# Patient Record
Sex: FEMALE | Race: OTHER | HISPANIC OR LATINO | URBAN - METROPOLITAN AREA
[De-identification: names, ages, dates, MRNs, and addresses within clinical notes are randomized per-mention and may not be internally consistent; named-entity substitution may affect disease eponyms.]

---

## 2023-07-19 ENCOUNTER — EMERGENCY (EMERGENCY)
Facility: HOSPITAL | Age: 38
LOS: 1 days | Discharge: HOME | End: 2023-07-19
Attending: EMERGENCY MEDICINE
Payer: MEDICAID

## 2023-07-19 VITALS
HEIGHT: 65 IN | OXYGEN SATURATION: 97 % | HEART RATE: 67 BPM | SYSTOLIC BLOOD PRESSURE: 137 MMHG | WEIGHT: 179.9 LBS | DIASTOLIC BLOOD PRESSURE: 90 MMHG | TEMPERATURE: 99 F | RESPIRATION RATE: 20 BRPM

## 2023-07-19 PROCEDURE — 99284 EMERGENCY DEPT VISIT MOD MDM: CPT

## 2023-07-19 RX ORDER — IBUPROFEN 200 MG
600 TABLET ORAL ONCE
Refills: 0 | Status: COMPLETED | OUTPATIENT
Start: 2023-07-19 | End: 2023-07-19

## 2023-07-19 RX ORDER — ACETAMINOPHEN 500 MG
975 TABLET ORAL ONCE
Refills: 0 | Status: COMPLETED | OUTPATIENT
Start: 2023-07-19 | End: 2023-07-19

## 2023-07-19 RX ORDER — DIAZEPAM 5 MG
5 TABLET ORAL ONCE
Refills: 0 | Status: DISCONTINUED | OUTPATIENT
Start: 2023-07-19 | End: 2023-07-19

## 2023-07-19 RX ADMIN — Medication 5 MILLIGRAM(S): at 23:18

## 2023-07-19 RX ADMIN — Medication 600 MILLIGRAM(S): at 23:18

## 2023-07-19 RX ADMIN — Medication 975 MILLIGRAM(S): at 23:18

## 2023-07-19 NOTE — ED PROVIDER NOTE - PATIENT PORTAL LINK FT
You can access the FollowMyHealth Patient Portal offered by [facility] by registering at the following website: [email or web address]. By joining [facility]’s FollowMyHealth portal, you will also be able to view your health information using other applications (apps) compatible with our system.

## 2023-07-19 NOTE — ED PROVIDER NOTE - OBJECTIVE STATEMENT
37-year-old female with history of arthritis previously on methotrexate and prednisone no longer taking either medication presents to the emergency department with complaint of sided neck pain for 3 days.  Patient reports that she has pain in the left side of her neck that radiates to her head and shoulder.  She reports that she has had similar pain in the past but this feels more intense.  She has been taking Tylenol with minimal relief last dose 11 AM.  She denies falls, trauma, numbness, tingling, vision changes, chest pain, shortness of breath, abdominal pain nausea, vomiting, fevers or chills.

## 2023-07-19 NOTE — ED PROVIDER NOTE - PROGRESS NOTE DETAILS
Attending Masom:  pt feeling better, informed of ct results, pt has pcp will fu outpt, pt comfortable with this plan, had no additional questions

## 2023-07-19 NOTE — ED PROVIDER NOTE - PHYSICAL EXAMINATION
CONSTITUTIONAL: Patient is awake, alert and oriented x 3. Patient is uncomfortable appearing and in no acute distress  HEAD: NCAT  NECK: supple, no midline cervical ttp, + L paraspinal cervical ttp and L trapezius ttp   LUNGS: CTA b/l, no wheezing or rales   HEART: RRR.+S1S2 no murmurs  ABDOMEN: Soft, non-distended, nttp,  no rebound or guarding  EXTREMITY: no edema or calf tenderness b/l, FROM upper and lower ext b/l  SKIN: with no rash or lesions  NEURO: No focal deficits

## 2023-07-19 NOTE — ED PROVIDER NOTE - ATTENDING APP SHARED VISIT CONTRIBUTION OF CARE
Attending MD Lemus:   I personally have seen and examined this patient.  Physician assistant note reviewed and agree on plan of care and except where noted.  See below for details.     seen in [place] [facility], interviewed in Thai    37F with PMH/PSH including arthritis (?rheumatoid) previously on Methotrexate and Prednisone (reports has not taken in two years) presents to the ED with neck pain.  Reports she has been having L sided neck pain for three days.  Reports pain is in L side of her neck and extends to shoulder and trap area.  Denies trauma, inciting event, MVC, fall.  Reports has had pain in the past but not this intensity.  Reports took Tylenol at 6am and then again at 11am, denies relief.  Denies numbness, weakness or tingling in extremities. Denies loss of urinary or bowel continence. Denies chest pain, shortness of breath, abdominal pain, nausea, vomiting, diarrhea, urinary complaints, fevers. Denies history of malignancy, epidural injections, AC use, prior spinal surgery, AAA.     Exam:   General: NAD  HENT: head NCAT, airway patent  Eyes: anicteric, no conjunctival injection   Lungs: lungs CTAB with good inspiratory effort, no wheezing, no rhonchi, no rales  Cardiac: +S1S2, no obvious m/r/g  GI: abdomen soft with +BS, NT, ND  : no CVAT  MSK: ranging neck and extremities freely, +L paracervical and midline tenderness, +L trap tenderness, no overlying skin changes  Neuro: moving all extremities spontaneously with 5/5 strength, nonfocal, no saddle anesthesia  Psych: normal mood and affect     A/P: 37F with L sided neck pain, given reported previous chronic steroid use, will eval with CT C Spine to eval for bony injury, possible worsening of known pathology (arthritis), MSK, will give muscle relaxant, analgesic, reassess

## 2023-07-19 NOTE — ED PROVIDER NOTE - NSFOLLOWUPINSTRUCTIONS_ED_ALL_ED_FT
Ha distendido los músculos y ligamentos de hawkins nimesh. Un movimiento repentino e incómodo puede tensar el nimesh. [place] ocurre a menudo con caídas o accidentes automovilísticos o lelo ciertos deportes. Las actividades cotidianas shani trabajar en urvashi computadora o dormir también pueden causar tensión en el nimesh si lo obligan a mantener el nimesh en urvashi posición incómoda lelo mucho tiempo.  Es común que el dolor de nimesh empeore lelo nick o dos días después de urvashi lesión, slava debería comenzar a sentirse mejor después de eso. Es posible que tenga más dolor y rigidez lelo varios días antes de que mejore. [place] se espera. Puede jefferson algunas semanas o más para que sane por completo. Un buen tratamiento en el hogar puede ayudarlo a mejorar más rápido y evitar futuros problemas en el nimesh.    La atención de seguimiento es urvashi parte clave de hawkins tratamiento y seguridad. Asegúrese de hacer y asistir a todas las citas y llame a hawkins médico si tiene problemas. También es urvashi buena idea conocer los resultados de feliberto pruebas y mantener urvashi lista de los medicamentos que deysi.    ¿Cómo puedes cuidarte en casa?  Pruebe el calor o el hielo, lo que se sienta mejor. Aplíquelo lelo 10 a 20 minutos a la vez, varias veces al día. Coloque un paño marcus entre el calor o el hielo y hawkins piel. También puede intentar cambiar entre calor y hielo.  Tenga cuidado con los medicamentos. Marlyn y siga todas las instrucciones en la etiqueta.  Si no está tomando un analgésico recetado, pregúntele a hawkins médico si puede jefferson un medicamento de venta bruno.  Si el médico le recetó un medicamento para el dolor, tómelo según lo prescrito.  Guarde feliberto analgésicos recetados donde nadie más pueda acceder a ellos. Cuando haya terminado de usarlos, deséchelos de forma rápida y camacho. [place] [facility] puede tener un sitio de entrega.  Frote suavemente el área para aliviar el dolor y ayudar con el flujo sanguíneo. Tómatelo con calma lelo un par de días. Puede realizar feliberto actividades habituales si no le duelen el nimesh ni lo ponen en riesgo de sufrir más estrés o lesiones.  Trate de dormir en urvashi almohada especial para el nimesh. Colóquelo debajo de hawkins nimesh, no debajo de hawkins beau. Colocar urvashi toalla alka enrollada debajo de tu nimesh mientras duermes también funcionará. Si usa urvashi almohada para el nimesh o urvashi toalla enrollada, no use hawkins almohada normal al mismo tiempo.  Para prevenir futuros michelle de nimesh, cornelius ejercicios para estirar y fortalecer el nimesh y la espalda.    Llame al 911 en cualquier momento que crea que puede necesitar atención de emergencia. Por ejemplo, llame si:    No puede  un brazo o urvashi pierna en absoluto.  Llame a hawkins médico ahora o busque atención médica inmediata si:    Tiene síntomas nuevos o peores en feliberto brazos, piernas, pecho, abdomen o glúteos. Los síntomas pueden incluir:  Entumecimiento u hormigueo.  Debilidad.  Dolor.  Pierde el control de la vejiga o los intestinos.  Esté atento a los cambios en hawkins janis y asegúrese de comunicarse con hawkins médico si:    No estás mejorando shani esperabas.

## 2023-07-19 NOTE — ED ADULT TRIAGE NOTE - MODE OF ARRIVAL
VISIT NOTE  Patient ID: Olamide is a 28 year old female.    Chief Complaint   Patient presents with   • Conjunctivitis     HPI:Pt present with complaint eye discharge 2 days.  Pt attempted treatment with washing out without lasting improvement.  Pt denies vision changes.  Pt reports wears contacts. Pt dianosed with strep and antibiotics ordered via telephone visit      Review of Systems   Constitutional: Negative for fever.   HENT: Positive for sore throat. Negative for congestion, dental problem, drooling and ear discharge.    Eyes: Positive for discharge and redness. Negative for photophobia, pain, itching and visual disturbance.         Physical Exam  HENT:      Right Ear: A middle ear effusion is present. Tympanic membrane is not erythematous.      Left Ear: Tympanic membrane is not erythematous.      Mouth/Throat:      Lips: Pink.      Pharynx: Pharyngeal swelling and posterior oropharyngeal erythema present.      Tonsils: Tonsillar exudate present.   Eyes:      General: Lids are normal.      Extraocular Movements: Extraocular movements intact.      Conjunctiva/sclera:      Right eye: Right conjunctiva is not injected. No exudate.     Left eye: Left conjunctiva is not injected. No exudate.  Pulmonary:      Effort: Pulmonary effort is normal.   Lymphadenopathy:      Cervical: No cervical adenopathy.   Neurological:      Mental Status: She is alert.           ASSESSMENT/PLAN  ED Diagnosis   1. Bacterial conjunctivitis        2. Elevated blood-pressure reading without diagnosis of hypertension             [employer] in

## 2023-07-20 VITALS
SYSTOLIC BLOOD PRESSURE: 118 MMHG | HEART RATE: 75 BPM | OXYGEN SATURATION: 97 % | TEMPERATURE: 98 F | RESPIRATION RATE: 17 BRPM | DIASTOLIC BLOOD PRESSURE: 88 MMHG

## 2023-07-20 PROCEDURE — 72125 CT NECK SPINE W/O DYE: CPT | Mod: 26,MG

## 2023-07-20 PROCEDURE — G1004: CPT

## 2023-07-20 PROCEDURE — 99284 EMERGENCY DEPT VISIT MOD MDM: CPT | Mod: 25

## 2023-07-20 PROCEDURE — 72125 CT NECK SPINE W/O DYE: CPT | Mod: MG

## 2023-07-20 RX ORDER — LIDOCAINE 4 G/100G
1 CREAM TOPICAL ONCE
Refills: 0 | Status: COMPLETED | OUTPATIENT
Start: 2023-07-20 | End: 2023-07-20

## 2023-07-20 RX ADMIN — LIDOCAINE 1 PATCH: 4 CREAM TOPICAL at 02:38

## 2023-07-20 NOTE — ED ADULT NURSE NOTE - NSFALLUNIVINTERV_ED_ALL_ED
Bed/Stretcher in lowest position, wheels locked, appropriate side rails in place/Call bell, personal items and telephone in reach/Instruct patient to call for assistance before getting out of bed/chair/stretcher/Non-slip footwear applied when patient is off stretcher/[place] to call system/Physically safe environment - no spills, clutter or unnecessary equipment/Purposeful proactive rounding/Room/bathroom lighting operational, light cord in reach

## 2023-07-20 NOTE — ED ADULT NURSE NOTE - OBJECTIVE STATEMENT
38 y/o female PMHx arthritis previously on methotrexate and prednisone arrives to [facility] ED by with c/o neck pain. Pt states has left sided neck pain that radiates to head and shoulder, has hx similar symptoms, pain minimally relieved with tylenol. Pt denies trauma or straining. Patient is A&Ox4. Respirations spontaneous and unlabored. Denies SOB, CP, abdominal pain, n/v/d, urinary symptoms, fever/chills. Skin intact.
